# Patient Record
Sex: FEMALE | Race: AMERICAN INDIAN OR ALASKA NATIVE
[De-identification: names, ages, dates, MRNs, and addresses within clinical notes are randomized per-mention and may not be internally consistent; named-entity substitution may affect disease eponyms.]

---

## 2019-05-01 ENCOUNTER — HOSPITAL ENCOUNTER (OUTPATIENT)
Dept: HOSPITAL 5 - OR | Age: 34
Discharge: HOME | End: 2019-05-01
Attending: OBSTETRICS & GYNECOLOGY
Payer: COMMERCIAL

## 2019-05-01 VITALS — DIASTOLIC BLOOD PRESSURE: 77 MMHG | SYSTOLIC BLOOD PRESSURE: 140 MMHG

## 2019-05-01 DIAGNOSIS — E66.9: ICD-10-CM

## 2019-05-01 DIAGNOSIS — Z98.890: ICD-10-CM

## 2019-05-01 DIAGNOSIS — Z86.2: ICD-10-CM

## 2019-05-01 DIAGNOSIS — N93.8: ICD-10-CM

## 2019-05-01 DIAGNOSIS — N84.0: Primary | ICD-10-CM

## 2019-05-01 DIAGNOSIS — Z72.89: ICD-10-CM

## 2019-05-01 DIAGNOSIS — Z87.891: ICD-10-CM

## 2019-05-01 DIAGNOSIS — Z79.899: ICD-10-CM

## 2019-05-01 DIAGNOSIS — Z98.891: ICD-10-CM

## 2019-05-01 DIAGNOSIS — K21.9: ICD-10-CM

## 2019-05-01 LAB
HCT VFR BLD CALC: 26.1 % (ref 30.3–42.9)
HGB BLD-MCNC: 8 GM/DL (ref 10.1–14.3)
MCHC RBC AUTO-ENTMCNC: 31 % (ref 30–34)
MCV RBC AUTO: 68 FL (ref 79–97)
PLATELET # BLD: 326 K/MM3 (ref 140–440)
RBC # BLD AUTO: 3.85 M/MM3 (ref 3.65–5.03)

## 2019-05-01 PROCEDURE — A4217 STERILE WATER/SALINE, 500 ML: HCPCS

## 2019-05-01 PROCEDURE — 85027 COMPLETE CBC AUTOMATED: CPT

## 2019-05-01 PROCEDURE — 82803 BLOOD GASES ANY COMBINATION: CPT

## 2019-05-01 PROCEDURE — C1782 MORCELLATOR: HCPCS

## 2019-05-01 PROCEDURE — 81025 URINE PREGNANCY TEST: CPT

## 2019-05-01 PROCEDURE — 88305 TISSUE EXAM BY PATHOLOGIST: CPT

## 2019-05-01 PROCEDURE — 36415 COLL VENOUS BLD VENIPUNCTURE: CPT

## 2019-05-01 PROCEDURE — 58563 HYSTEROSCOPY ABLATION: CPT

## 2019-05-01 NOTE — OPERATIVE REPORT
Operative Report


Operative Report: 


Date of procedure: May 1, 2019 


Preoperative diagnosis:


1) Dysfunctional Uterine Bleeding


2) Obesity


Postoperative diagnosis: Same


Procedure:


1)Diagnostic Hysteroscopy


2)Myosure Endometrial Sampling 


3)NovaSure Endometrial Ablation


Surgeon: Esthela Horn M.D.


Findings: 


1) Small anteverted mobile uterus that sounded to 9 cm


2) Proliferative endometrium on hysteroscopy 


Anesthesia: General with LMA 


Estimated blood loss: Minimal (25 mL )


IV fluid: 700 mL


Specimens: Endometrial curettings to pathology


Drains: None


Complications: None


Disposition: Stable to PACU





Indications for procedure:


The patient is a 34-year-old -American female  2 para  who 

presents for surgical management of dysfunctional uterine bleeding after failed 

medical management.





Operation in detail:





After the risks, benefits, alternatives and complications of the procedure were 

explained to the patient, she gave informed consent for the procedure.  She was 

subsequently taken to the operating room and placed in the dorsal supine 

position.  SCDs noted to be in place and functioning.  General anesthesia was 

then induced without difficulty. The patient was in placement dorsal lithotomy 

position and prepped and draped in normal sterile fashion.  A timeout was 

performed.





An exam under anesthesia was performed yielding a mobile anteverted uterus. The 

bladder was then catheterized and drained of urine.  An open sided speculum was 

then placed into the vagina for adequate visualization of the cervix.  The 

anterior lip of the cervix was then grasped with a tenaculum for traction. The 

cavity length was then noted to be 4.5 cm.  At this time a hysteroscope was 

introduced through the cervix to visualize the endometrial cavity which revealed

proliferative endometrium. The Myosure device was used to obtain endometrial 

curettings which were sent to pathology. Attention was then turned to the 

endometrial ablation.





At this time, the cervix was noted to be sufficiently dilated to accommodate the

Novasure device. Next the disposable NovaSure device was connected to the RF 

controller.  The NovaSure disposable device was deployed to the locked position 

and noted to fully extend.  The device was then placed in the unlocked position.

 The disposable device was then inserted into the uterine cavity with traction o

n the tenaculum.  The device was then seated per protocol. After moving the 

device back 0.5 cm, the device was moved superiorly and inferiorly and rotated 

clockwise and counterclockwise to 45.   The cavity width at this time was noted

to be 4.7 cm. The cervical collar was then advanced to the cervix. The cavity 

assessment was then initiated and passed.  The ablation procedure was then 

initiated with a 2 minute duration.  The cervical collar was moved away from the

cervix, the device was moved to the unlocked position, and the disposable 

NovaSure device was removed from the uterine cavity. At this time, the single-

tooth tenaculum was removed from the cervix. Silver nitrate and pressure were 

placed on the puncture sites to achieve hemostasis. Hemostasis was noted.  All 

instruments were removed from the vagina atraumatically and the procedure was 

ended.  The patient was placed into the dorsal supine position and extubated 

without difficulty.  She was subsequently taken to the PACU in stable condition.

 She tolerated the procedure well.  All counts were correct 2.

## 2019-05-01 NOTE — SHORT STAY SUMMARY
Short Stay Documentation


Date of service: 05/01/19





- History


H&P: dictated


Social history: no significant social history





- Allergies and Medications


Current Medications: 


                                    Allergies





No Known Allergies Allergy (Verified 05/01/19 01:19)


   





                                Home Medications











 Medication  Instructions  Recorded  Confirmed  Last Taken  Type


 


No Known Home Medications [No  04/26/19 04/26/19 Unknown History





Reported Home Medications]     








Active Medications





Fentanyl (Sublimaze)  50 mcg IV Q5MIN PRN


   PRN Reason: Pain , Severe (7-10)


   Stop: 05/01/19 23:59


Hydromorphone HCl (Dilaudid)  0.5 mg IV Q10MIN PRN


   PRN Reason: Pain , Severe (7-10)


   Stop: 05/01/19 23:59


Cefazolin Sodium (Ancef/Sterile Water 2 Gm/20 Ml)  2 gm in 20 mls @ 80 mls/hr IV

PREOP NR; Protocol


   Stop: 05/01/19 23:59


Lactated Ringer's (Lactated Ringers)  1,000 mls @ 75 mls/hr IV AS DIRECT FERNANDA


Meperidine HCl (Demerol)  25 mg IV ONCE PRN


   PRN Reason: Shivering


   Stop: 05/01/19 23:59


Midazolam HCl (Versed)  2 mg IV PREOP NR


   Stop: 05/01/19 23:59


Naloxone HCl (Narcan 0.4 Mg/1 Ml)  0.1 mg IV Q2MIN PRN


   PRN Reason: Res Rate </= 8 or 02 SAT < 92%


   Stop: 05/01/19 23:59


Ondansetron HCl (Zofran)  4 mg IV ONCE PRN


   PRN Reason: Nausea And Vomiting


   Stop: 05/01/19 23:59











- Physical exam


Breasts: deferred





- Brief post op/procedure progress note


Date of procedure: 05/01/19


Pre-op diagnosis: Dysfunctional Uterine Bleeding 


Post-op diagnosis: same


Procedure: 





1) Hysteroscopy


2) Myosure endometrial sampling 


3) Novasure endometrial ablation 


Anesthesia: GETA


Findings: 





1) Uterus sounded to 9 cm


2) Proliferative endometrium 


Surgeon: MARIAN HORN


Estimated blood loss: minimal (25 mL)


Pathology: list (endometrial curettingd)


Specimen disposition: to lab


Condition: stable





- Hospital course


Hospital course: 


Patient underwent hysteroscopy, Myosure endometrial sampling and NovaSure 

endometrial ablation which she tolerated well.  She was observed in the PACU 

until she met discharge criteria.  She'll follow-up in the office in 2 weeks 

with Dr. Horn.








- Disposition


Condition at discharge: Stable


Disposition: DC-01 TO HOME OR SELFCARE





- Discharge Diagnoses


(1) Obesity


Status: Acute   


Qualifiers: 


   Obesity type: unspecified obesity type   Serious obesity comorbidity 

presence: unspecified whether serious comorbidity present   Body mass index: BMI

37.0-37.9 





(2) DUB (dysfunctional uterine bleeding)


Status: Acute   





Short Stay Discharge Plan


Activity: other (Nothing per vagina x 4 wks )


Weight Bearing Status: Full Weight Bearing


Diet: regular


Follow up with: 


PRIMARY CARE,MD [Primary Care Provider] - 7 Days


MARIAN HORN MD [Staff Physician] - 05/15/19 (Please call to schedule postop appt

)


Prescriptions: 


Ibuprofen [Motrin] 800 mg PO Q8HR PRN #30 tablet


 PRN Reason: Pain, Moderate (4-6)


oxyCODONE /ACETAMINOPHEN [Percocet 5/325] 1 tab PO Q6HR PRN #30 tablet


 PRN Reason: Pain

## 2019-05-01 NOTE — HISTORY AND PHYSICAL REPORT
History of Present Illness


Date of examination: 19


Chief complaint: 





dysfunctional uterine bleeding 


History of present illness: 





  Patient is a 34-year-old -American female  2 para  who 

presents for surgical management of dysfunctional uterine bleeding after failed 

medical management.





Past History


Past Medical History: blood transfusion, other (Obesity)


Past Surgical History:  section, other (abdominoplasty, liposuction)


Family/Genetic History: diabetes, hypertension


Social history: no significant social history





Medications and Allergies


                                    Allergies











Allergy/AdvReac Type Severity Reaction Status Date / Time


 


No Known Allergies Allergy   Unverified 19 14:18











                                Home Medications











 Medication  Instructions  Recorded  Confirmed  Last Taken  Type


 


No Known Home Medications [No  19 Unknown History





Reported Home Medications]     














Review of Systems


All systems: negative





- Physical Exam


Breasts: Positive: deferred


Cardiovascular: Regular rate


Lungs: Positive: Clear to auscultation


Abdomen: Positive: soft (obese)


Extremities: Positive: normal





Results


All other labs normal.








Assessment and Plan





A: Dysfunctional uterine bleeding


    Obesity





P: Proceed with hysteroscopy, Myosure endometrial sampling, NovaSure endometrial

ablation and other indicated procedures

## 2019-05-01 NOTE — ANESTHESIA DAY OF SURGERY
Anesthesia Day of Surgery





- Day of Surgery


Patient Examined: Yes


Patient H&P Reviewed: Yes


Patient is NPO: Yes ([id number])


Beta Blockers: No


Cardiac Clearance: No


Pulmonary Clearance: No

## 2019-05-01 NOTE — ANESTHESIA CONSULTATION
Anesthesia Consult and Med Hx


Date of service: 05/01/19





- Airway


Anesthetic Teeth Evaluation: Good


ROM Head & Neck: Adequate


Mental/Hyoid Distance: Adequate


Mallampati Class: Class I


Intubation Access Assessment: Probably Good





- Pulmonary Exam


CTA: Yes





- Cardiac Exam


Cardiac Exam: RRR





- Pre-Operative Health Status


ASA Pre-Surgery Classification: ASA2


Proposed Anesthetic Plan: General





- Pulmonary


Hx Smoking: Yes (Former; quit 1.5 years ago )


Hx Respiratory Symptoms: No


SOB: No





- Cardiovascular System


Hx Hypertension: No


Hx Heart Attack/AMI: No


Hx Cardia Arrhythmia: No





- Central Nervous System


Hx Neuromuscular Disorder: No


Hx Psychiatric Problems: No





- Gastrointestinal


Hx Gastroesophageal Reflux Disease: No





- Endocrine


Hx Renal Disease: No


Hx Liver Disease: No


Hx Insulin Dependent Diabetes: No


Hx Non-Insulin Dependent Diabetes: No





- Hematic


Hx Anemia: Yes





- Other Systems


Hx Alcohol Use: Yes (Occas)


Hx Cancer: No


Hx Obesity: Yes (BMI 37.4)





- Additional Comments


Anesthesia Medical History Comments: No GAC, No FHAC

## 2019-10-14 LAB
BASOPHILS # (AUTO): 0 K/MM3 (ref 0–0.1)
BASOPHILS NFR BLD AUTO: 0.6 % (ref 0–1.8)
EOSINOPHIL # BLD AUTO: 0.1 K/MM3 (ref 0–0.4)
EOSINOPHIL NFR BLD AUTO: 2.1 % (ref 0–4.3)
HCT VFR BLD CALC: 35.1 % (ref 30.3–42.9)
HGB BLD-MCNC: 11 GM/DL (ref 10.1–14.3)
LYMPHOCYTES # BLD AUTO: 1.8 K/MM3 (ref 1.2–5.4)
LYMPHOCYTES NFR BLD AUTO: 40.1 % (ref 13.4–35)
MCHC RBC AUTO-ENTMCNC: 32 % (ref 30–34)
MCV RBC AUTO: 75 FL (ref 79–97)
MONOCYTES # (AUTO): 0.4 K/MM3 (ref 0–0.8)
MONOCYTES % (AUTO): 10.1 % (ref 0–7.3)
PLATELET # BLD: 248 K/MM3 (ref 140–440)
RBC # BLD AUTO: 4.67 M/MM3 (ref 3.65–5.03)

## 2019-10-14 NOTE — ANESTHESIA CONSULTATION
Anesthesia Consult and Med Hx


Date of service: 10/14/19





- Airway


Anesthetic Teeth Evaluation: Good


ROM Head & Neck: Adequate


Mental/Hyoid Distance: Adequate


Mallampati Class: Class II


Intubation Access Assessment: Good





- Pulmonary Exam


CTA: Yes





- Cardiac Exam


Cardiac Exam: RRR





- Pre-Operative Health Status


ASA Pre-Surgery Classification: ASA2


Proposed Anesthetic Plan: General


Nerve Block: TAP





- Pulmonary


Hx Smoking: Yes (Former smoker)


Hx Respiratory Symptoms: No


SOB: No





- Cardiovascular System


Hx Cardia Arrhythmia: No





- Central Nervous System


Hx Neuromuscular Disorder: No


Hx Psychiatric Problems: No





- Gastrointestinal


Hx Gastroesophageal Reflux Disease: No





- Endocrine


Hx Insulin Dependent Diabetes: No


Hx Non-Insulin Dependent Diabetes: No





- Hematic


Hx Anemia: Yes





- Other Systems


Hx Alcohol Use: Yes (Occas)


Hx Cancer: No


Hx Obesity: Yes (BMI 37.4)





- Additional Comments


Anesthesia Medical History Comments: Former smoker 20 year hx, elevated BMI

## 2019-10-16 ENCOUNTER — HOSPITAL ENCOUNTER (OUTPATIENT)
Dept: HOSPITAL 5 - OR | Age: 34
Setting detail: OBSERVATION
LOS: 1 days | Discharge: HOME | End: 2019-10-17
Attending: OBSTETRICS & GYNECOLOGY | Admitting: OBSTETRICS & GYNECOLOGY
Payer: COMMERCIAL

## 2019-10-16 DIAGNOSIS — N93.8: Primary | ICD-10-CM

## 2019-10-16 PROCEDURE — 36415 COLL VENOUS BLD VENIPUNCTURE: CPT

## 2019-10-16 PROCEDURE — 96375 TX/PRO/DX INJ NEW DRUG ADDON: CPT

## 2019-10-16 PROCEDURE — A4217 STERILE WATER/SALINE, 500 ML: HCPCS

## 2019-10-16 PROCEDURE — 85025 COMPLETE CBC W/AUTO DIFF WBC: CPT

## 2019-10-16 PROCEDURE — 85018 HEMOGLOBIN: CPT

## 2019-10-16 PROCEDURE — 84703 CHORIONIC GONADOTROPIN ASSAY: CPT

## 2019-10-16 PROCEDURE — 86900 BLOOD TYPING SEROLOGIC ABO: CPT

## 2019-10-16 PROCEDURE — 96374 THER/PROPH/DIAG INJ IV PUSH: CPT

## 2019-10-16 PROCEDURE — 96376 TX/PRO/DX INJ SAME DRUG ADON: CPT

## 2019-10-16 PROCEDURE — 58552 LAPARO-VAG HYST INCL T/O: CPT

## 2019-10-16 PROCEDURE — 64450 NJX AA&/STRD OTHER PN/BRANCH: CPT

## 2019-10-16 PROCEDURE — 88307 TISSUE EXAM BY PATHOLOGIST: CPT

## 2019-10-16 PROCEDURE — G0378 HOSPITAL OBSERVATION PER HR: HCPCS

## 2019-10-16 PROCEDURE — 86850 RBC ANTIBODY SCREEN: CPT

## 2019-10-16 PROCEDURE — 85014 HEMATOCRIT: CPT

## 2019-10-16 PROCEDURE — 86901 BLOOD TYPING SEROLOGIC RH(D): CPT

## 2019-10-16 RX ADMIN — OXYCODONE AND ACETAMINOPHEN PRN TAB: 5; 325 TABLET ORAL at 15:17

## 2019-10-16 RX ADMIN — KETOROLAC TROMETHAMINE SCH MG: 30 INJECTION, SOLUTION INTRAMUSCULAR at 20:19

## 2019-10-16 RX ADMIN — KETOROLAC TROMETHAMINE SCH MG: 30 INJECTION, SOLUTION INTRAMUSCULAR at 13:03

## 2019-10-16 NOTE — HISTORY AND PHYSICAL REPORT
History of Present Illness


Date of examination: 10/16/19


Date of admission: 


10/16/2019


Chief complaint: 


 Dysfunctional uterine bleeding


History of present illness: 


34-year-old  with a history of dysfunctional uterine bleeding.  The 

patient had undergone  an endometrial ablation and sampling of the endometrium 

in May 2019 without significant improvement in her symptoms.  She reported 

having heavy menses with passage of clots and discomfort during her cycle.  The 

patient elected to undergo definitive surgical management.








Past History


Past Medical History: other (anemia)


Past Surgical History:  section, other (endometrial ablation)





- Obstetrical History


: 2


Para: 2


Hx # Term Pregnancies: 2


Number of  Pregnancies: 0


Spontaneous Abortions: 0


Induced : 0


Number of Living Children: 2





Medications and Allergies


                                    Allergies











Allergy/AdvReac Type Severity Reaction Status Date / Time


 


No Known Allergies Allergy   Verified 10/09/19 14:16











                                Home Medications











 Medication  Instructions  Recorded  Confirmed  Last Taken  Type


 


No Known Home Medications [No  10/10/19 10/10/19 Unknown History





Reported Home Medications]     











Active Meds: 


Active Medications





Celecoxib (Celebrex)  200 mg PO PREOP NR


   Stop: 10/16/19 23:59


Fentanyl (Sublimaze)  100 mcg IV ONCE PRN


   PRN Reason: sedation for nerve block


   Stop: 10/16/19 23:59


Gabapentin (Neurontin)  300 mg PO PREOP NR


   Stop: 10/16/19 23:59


Lactated Ringer's (Lactated Ringers)  1,000 mls @ 100 mls/hr IV AS DIRECT FERNANDA


Lactated Ringer's (Lactated Ringers)  1,000 mls @ 100 mls/hr IV AS DIRECT FERNANDA


Midazolam HCl (Versed)  2 mg IV PREOP NR


   Stop: 10/16/19 23:59











Review of Systems


All systems: negative


Genitourinary: vaginal bleeding, pelvic pain





- Vital Signs


Vital signs: 


                                   Vital Signs











Temp Pulse Resp BP Pulse Ox


 


 98.2 F   74   18   146/95   98 


 


 10/14/19 10:30  10/14/19 10:30  10/14/19 10:30  10/14/19 10:30  10/14/19 10:30








                                        











Temp Pulse Resp BP Pulse Ox


 


 98.1 F   83   16   138/94   99 


 


 10/16/19 09:15  10/16/19 09:15  10/16/19 09:15  10/16/19 09:15  10/16/19 09:15














- Physical Exam


Breasts: Positive: deferred


Cardiovascular: Regular rate


Lungs: Positive: Clear to auscultation


Abdomen: Positive: normal appearance





Results


Result Diagrams: 


                                 10/14/19 10:30





All other labs normal.








Assessment and Plan





- Patient Problems


(1) DUB (dysfunctional uterine bleeding)


Current Visit: No   Status: Acute   


Plan to address problem: 


Will proceed with a robotic hysterectomy

## 2019-10-16 NOTE — OPERATIVE REPORT
Operative Report


Operative Report: 


Date of surgery: 10/16/2019





Preoperative diagnoses: Dysfunctional uterine bleeding





Postoperative diagnoses: Same as above





Procedure: Robotic hysterectomy; bilateral salpingectomy





Surgeon: Giuliana Cottrell M.D.





Assistant: Carmen Horn





Anesthesia: Gen. endotracheal anesthesia





Estimated blood loss: 25ml





Pathology: Uterus, cervix, bilateral tubes





Indication: 34-year-old  with a history of dysfunctional uterine 

bleeding.  The patient had previously undergone endometrial ablation without 

improvement of her symptoms and elected to undergo definitive surgical 

management





Procedure:


     The patient was taken to the operating room and given general endotracheal 

anesthesia without complication.  She is prepped and draped in a normal sterile 

fashion.  A bivalve speculum was placed in the patient's vagina and a single-

tooth tenaculum placed on the anterior lip of the cervix.  The uterus was s

ounded with the uterine sound.  A V care uterine manipulator was placed in the 

bivalve speculum was then removed.  Attention was then turned to the patient's 

abdomen where a millimeter supra umbilical skin incision was then made.  A 

Veress needle was placed and peritoneal entry was verified water-filled syringe.

 Insufflation of the peritoneal cavity was performed with CO2 gas.  The 12 mm 

trocar was then placed under direct visualization.  An additional 8 mm trocar 

was placed on the patient's left and right lateral side just opposite of the 

supraumbilical trocar.  An additional 5 mm right lateral trocar was then placed 

as the accessory port.  The patient was then placed in steep Trendelenburg.  The

da Rafael robot was then engaged.  A fenestrated forcep was placed in arm 2 and a

vessel sealer was placed in arm 1.  The surgeon then transferred to the surgical

console.  General survey revealed normal size uterus with normal tubes and 

ovaries bilaterally.  The mesosalpinx was then isolated on the right.  The 

vessel sealer was used to coagulate the mesosalpinx which  was then transected. 

The tube was transected from the ovary.  The tubo-ovarian ligament was then 

coagulated and transected.  The round ligament was then coagulated and 

transected also.  The vesicouterine peritoneum was then entered from the 

patient's right side.  The uterine vessels were then coagulated with the vessel 

sealer.  The vessels were then transected .  Attention was then turned to the 

patient's left side where the tubo-ovarian ligament and mesosalpinx were again 

isolated coagulated and transected.  The vesical peritoneum was then entered 

from the left and joined in the midline.  Peritoneum was reflected off of the 

lower uterine segment.  Uterine vessels were then coagulated and then 

transected.  The blood supply to the uterus was adequately contained, a 

posterior colpotomy was made.  The V care ring was visualized.  Posterior 

colpotomy was created with the monopolar scissors.  The incision was continued 

circumferentially until anterior colpotomy was made.  The cervix and uterus were

amputated from the vaginal cuff.  The uterus was then removed along with the 

tubes bilaterally through the vagina and a warm laparotomy sponge was placed and

maintain the pneumoperitoneum.  The vaginal cuff was then closed in a running 

fashion with V lock suture.  Irrigation of the pelvis was performed.  Hemoblast 

was applied to the incision.  The supraumbilical 12 mm trocar site was closed 

with the Wilile Goetz device.  The skin was then reapproximated with 4-0 

Monocryl.  The tissue was sent to pathology which included the cervix, tubes and

uterus.  The patient was then successfully extubated.  She was then taken to the

recovery room in stable condition.  All sponge laps and needle counts were gloria

ect x2.

## 2019-10-17 VITALS — DIASTOLIC BLOOD PRESSURE: 78 MMHG | SYSTOLIC BLOOD PRESSURE: 134 MMHG

## 2019-10-17 LAB
HCT VFR BLD CALC: 32.5 % (ref 30.3–42.9)
HGB BLD-MCNC: 10.3 GM/DL (ref 10.1–14.3)

## 2019-10-17 RX ADMIN — OXYCODONE AND ACETAMINOPHEN PRN TAB: 5; 325 TABLET ORAL at 06:37

## 2019-10-17 RX ADMIN — KETOROLAC TROMETHAMINE SCH MG: 30 INJECTION, SOLUTION INTRAMUSCULAR at 02:50

## 2019-10-17 RX ADMIN — OXYCODONE AND ACETAMINOPHEN PRN TAB: 5; 325 TABLET ORAL at 12:45

## 2019-10-17 RX ADMIN — OXYCODONE AND ACETAMINOPHEN PRN TAB: 5; 325 TABLET ORAL at 00:17

## 2019-10-17 NOTE — DISCHARGE SUMMARY
Providers





- Providers


Date of Admission: 


10/16/19 12:03





Date of discharge: 10/17/19


Attending physician: 


PACO TREVIÑO





Primary care physician: 


PRIMARY CARE MD








Hospitalization


Reason for admission: other (Dysfunctional uterine bleeding)


Procedure: other (Robotic hysterectomy)


Discharge diagnosis: other (Dysfunctional uterine bleeding)


Hospital course: 





Patient was admitted the day of surgery. See op note. Postop uneventful.


Condition at discharge: Good


Disposition: DC-01 TO HOME OR SELFCARE





- Discharge Diagnoses


(1) DUB (dysfunctional uterine bleeding)


Status: Acute   





Plan





- Discharge Medications


Prescriptions: 


Ibuprofen [Motrin] 800 mg PO Q8HR PRN #60 tablet


 PRN Reason: Pain, Mild (1-3)


oxyCODONE /ACETAMINOPHEN [Percocet 5/325] 1 tab PO Q6HR PRN #30 tablet


 PRN Reason: Pain





- Provider Discharge Summary


Activity: no sex for 6 weeks, no heavy lifting 4 weeks, no strenuous exercise


Diet: routine


Instructions: routine


Additional instructions: 


[]  Smoking cessation referral if applicable(refer to patient education folder f

or contact #)


[]  Refer to [facility] Booklet








Call your doctor immediately for:


* Fever > 100.5


* Heavy vaginal bleeding ( >1 pad per hour)


* Severe persistent headache


* Shortness of breath


* Reddened, hot, painful area to leg or breast


* Drainage or odor from incision.





* Keep incision clean and dry at all times and follow doctor's instructions 

regarding bathing/showering





schedule followup in 4 weeks





- Follow up plan

## 2019-10-17 NOTE — PROGRESS NOTE
Assessment and Plan





- Patient Problems


(1) DUB (dysfunctional uterine bleeding)


Current Visit: No   Status: Acute   


Plan to address problem: 


patient doing well


discharge home








Subjective





- Subjective


Date of service: 10/17/19


Interval history: 


Patient doing well. Surgery discussed and postop care. Tolerating clears


Patient reports: appetite normal, voiding normally, pain well controlled





Objective





- Vital Signs


Latest vital signs: 


                                   Vital Signs











  Temp Pulse Resp BP BP Pulse Ox


 


 10/17/19 05:36  98.0 F  102 H  18  108/68   100


 


 10/17/19 01:40  97.9 F  101 H  18   132/83  94


 


 10/17/19 00:27  98.6 F  97 H  18  126/85   95


 


 10/16/19 21:03  97.9 F  106 H  22  144/88   92


 


 10/16/19 16:31  98.1 F  101 H  18  143/92   98


 


 10/16/19 14:07  98.0 F  86  18   138/77  100


 


 10/16/19 13:35    14   


 


 10/16/19 13:30  98 F  86  16  138/77   96


 


 10/16/19 13:20    14   


 


 10/16/19 13:03    12   


 


 10/16/19 13:00   82  13  127/76   96


 


 10/16/19 12:45   78  14  122/77   100


 


 10/16/19 12:29   78  18  128/70   96


 


 10/16/19 12:25   80  16  123/69   99


 


 10/16/19 12:19  96.8 F L  82  16  116/76   99


 


 10/16/19 10:27   96 H  9 L  162/85   99


 


 10/16/19 10:22   79  13  141/81   99


 


 10/16/19 10:17   75  12  140/84   100


 


 10/16/19 09:42    16   


 


 10/16/19 09:41    16   


 


 10/16/19 09:25  96.8 F L  82  14  127/76   96


 


 10/16/19 09:15  98.1 F  83  16  138/94   99








                                Intake and Output











 10/16/19 10/17/19 10/17/19





 22:59 06:59 14:59


 


Intake Total  490 


 


Output Total 1200 1900 


 


Balance -1200 -1410 


 


Intake:   


 


  Oral  250 


 


  Intake, Free Water  240 


 


Output:   


 


  Urine 1200 1900 


 


    Indwelling Catheter 1200 1900 


 


Other:   


 


  Total, Intake Amount  250 


 


  Total, Output Amount 900 600 














- Exam


Abdomen: Present: normal appearance